# Patient Record
(demographics unavailable — no encounter records)

---

## 2025-04-01 NOTE — HISTORY OF PRESENT ILLNESS
[de-identified] : The patient comes in today with swelling to her right knee. She states it is atraumatic, but she notes having a lot of ecchymosis and bruising around her knee. The patient states the onset/injury occurred on 03/21/2025. This injury is not work related or due to an automobile accident. The patient states the pain is localized. The patient describes the pain as dull. The patient kneeling makes her symptoms worse. The patient indicates a pain level of 4 on a pain scale of 0-10.

## 2025-04-01 NOTE — PHYSICAL EXAM
[de-identified] : Right Knee:  Range of Motion in Degrees	 	                                        Claimant: 	         Normal:	 Flexion Active	                           135 	             135-degrees	 Flexion Passive	                   135	             135-degrees	 Extension Active	                    0-5	              0-5-degrees	 Extension Passive	                    0-5	              0-5-degrees	  No weakness to flexion/extension.  Positive pretibial tubercle bursitis with ecchymosis.  No evidence of infection. No evidence of instability in the AP plane or varus or valgus stress.  Negative Lachman.  Negative pivot shift.  Negative anterior drawer test.  Negative posterior drawer test.  Negative Coty.  Negative Apley grind.  No medial or lateral joint line tenderness.  No tenderness over the medial and lateral facet of the patella.  No patellofemoral crepitations.  No lateral tilting patella.  No patella apprehension.  No crepitation in the medial and lateral femoral condyle.  No proximal or distal swelling, edema or tenderness.  No gross motor or sensory deficits.  No intra-articular swelling.  2+ DP and PT pulses. No varus or valgus malalignment.     Left Knee:  Range of Motion in Degrees	 	                                  Claimant:	         Normal:	 Flexion Active	                   135 	                135-degrees	 Flexion Passive	           135	                135-degrees	 Extension Active	           0-5	                 0-5-degrees	 Extension Passive	           0-5	                 0-5-degrees	  No weakness to flexion/extension.  No evidence of instability in the AP plane or varus or valgus stress.  Negative Lachman.  Negative pivot shift.  Negative anterior drawer test.  Negative posterior drawer test.  Negative Coty.  Negative Apley grind.  No medial or lateral joint line tenderness.  No tenderness over the medial and lateral facet of the patella.  No patellofemoral crepitations.  No lateral tilting patella.  No patellar apprehension.  No crepitation in the medial and lateral femoral condyle.  No proximal or distal swelling, edema or tenderness.  No gross motor or sensory deficits.  No intra-articular swelling.  2+ DP and PT pulses. No varus or valgus malalignment.  Skin is intact.  No rashes, scars or lesions.     [de-identified] : Ambulating with a slightly antalgic to antalgic gait.  Station:  Normal.  [de-identified] : Appearance:  Well-developed, well-nourished female in no acute distress.   [de-identified] : Radiographs, which were taken in the office today, two views of the right knee, show mild degenerative changes.

## 2025-04-01 NOTE — DISCUSSION/SUMMARY
[de-identified] : At this time, due to pretibial bursitis of the right knee, I recommend wrapping, compression and ice. We discussed signs and symptoms of infection (at this point she shows none). She will be reassessed in two weeks.

## 2025-04-01 NOTE — HISTORY OF PRESENT ILLNESS
[de-identified] : The patient comes in today with swelling to her right knee. She states it is atraumatic, but she notes having a lot of ecchymosis and bruising around her knee. The patient states the onset/injury occurred on 03/21/2025. This injury is not work related or due to an automobile accident. The patient states the pain is localized. The patient describes the pain as dull. The patient kneeling makes her symptoms worse. The patient indicates a pain level of 4 on a pain scale of 0-10.

## 2025-04-01 NOTE — PHYSICAL EXAM
[de-identified] : Right Knee:  Range of Motion in Degrees	 	                                        Claimant: 	         Normal:	 Flexion Active	                           135 	             135-degrees	 Flexion Passive	                   135	             135-degrees	 Extension Active	                    0-5	              0-5-degrees	 Extension Passive	                    0-5	              0-5-degrees	  No weakness to flexion/extension.  Positive pretibial tubercle bursitis with ecchymosis.  No evidence of infection. No evidence of instability in the AP plane or varus or valgus stress.  Negative Lachman.  Negative pivot shift.  Negative anterior drawer test.  Negative posterior drawer test.  Negative Coty.  Negative Apley grind.  No medial or lateral joint line tenderness.  No tenderness over the medial and lateral facet of the patella.  No patellofemoral crepitations.  No lateral tilting patella.  No patella apprehension.  No crepitation in the medial and lateral femoral condyle.  No proximal or distal swelling, edema or tenderness.  No gross motor or sensory deficits.  No intra-articular swelling.  2+ DP and PT pulses. No varus or valgus malalignment.     Left Knee:  Range of Motion in Degrees	 	                                  Claimant:	         Normal:	 Flexion Active	                   135 	                135-degrees	 Flexion Passive	           135	                135-degrees	 Extension Active	           0-5	                 0-5-degrees	 Extension Passive	           0-5	                 0-5-degrees	  No weakness to flexion/extension.  No evidence of instability in the AP plane or varus or valgus stress.  Negative Lachman.  Negative pivot shift.  Negative anterior drawer test.  Negative posterior drawer test.  Negative Coty.  Negative Apley grind.  No medial or lateral joint line tenderness.  No tenderness over the medial and lateral facet of the patella.  No patellofemoral crepitations.  No lateral tilting patella.  No patellar apprehension.  No crepitation in the medial and lateral femoral condyle.  No proximal or distal swelling, edema or tenderness.  No gross motor or sensory deficits.  No intra-articular swelling.  2+ DP and PT pulses. No varus or valgus malalignment.  Skin is intact.  No rashes, scars or lesions.     [de-identified] : Ambulating with a slightly antalgic to antalgic gait.  Station:  Normal.  [de-identified] : Appearance:  Well-developed, well-nourished female in no acute distress.   [de-identified] : Radiographs, which were taken in the office today, two views of the right knee, show mild degenerative changes.

## 2025-04-01 NOTE — DISCUSSION/SUMMARY
[de-identified] : At this time, due to pretibial bursitis of the right knee, I recommend wrapping, compression and ice. We discussed signs and symptoms of infection (at this point she shows none). She will be reassessed in two weeks.

## 2025-04-01 NOTE — DISCUSSION/SUMMARY
[de-identified] : At this time, due to pretibial bursitis of the right knee, I recommend wrapping, compression and ice. We discussed signs and symptoms of infection (at this point she shows none). She will be reassessed in two weeks.

## 2025-04-01 NOTE — HISTORY OF PRESENT ILLNESS
[de-identified] : The patient comes in today with swelling to her right knee. She states it is atraumatic, but she notes having a lot of ecchymosis and bruising around her knee. The patient states the onset/injury occurred on 03/21/2025. This injury is not work related or due to an automobile accident. The patient states the pain is localized. The patient describes the pain as dull. The patient kneeling makes her symptoms worse. The patient indicates a pain level of 4 on a pain scale of 0-10.

## 2025-04-01 NOTE — PHYSICAL EXAM
[de-identified] : Right Knee:  Range of Motion in Degrees	 	                                        Claimant: 	         Normal:	 Flexion Active	                           135 	             135-degrees	 Flexion Passive	                   135	             135-degrees	 Extension Active	                    0-5	              0-5-degrees	 Extension Passive	                    0-5	              0-5-degrees	  No weakness to flexion/extension.  Positive pretibial tubercle bursitis with ecchymosis.  No evidence of infection. No evidence of instability in the AP plane or varus or valgus stress.  Negative Lachman.  Negative pivot shift.  Negative anterior drawer test.  Negative posterior drawer test.  Negative Coty.  Negative Apley grind.  No medial or lateral joint line tenderness.  No tenderness over the medial and lateral facet of the patella.  No patellofemoral crepitations.  No lateral tilting patella.  No patella apprehension.  No crepitation in the medial and lateral femoral condyle.  No proximal or distal swelling, edema or tenderness.  No gross motor or sensory deficits.  No intra-articular swelling.  2+ DP and PT pulses. No varus or valgus malalignment.     Left Knee:  Range of Motion in Degrees	 	                                  Claimant:	         Normal:	 Flexion Active	                   135 	                135-degrees	 Flexion Passive	           135	                135-degrees	 Extension Active	           0-5	                 0-5-degrees	 Extension Passive	           0-5	                 0-5-degrees	  No weakness to flexion/extension.  No evidence of instability in the AP plane or varus or valgus stress.  Negative Lachman.  Negative pivot shift.  Negative anterior drawer test.  Negative posterior drawer test.  Negative Coty.  Negative Apley grind.  No medial or lateral joint line tenderness.  No tenderness over the medial and lateral facet of the patella.  No patellofemoral crepitations.  No lateral tilting patella.  No patellar apprehension.  No crepitation in the medial and lateral femoral condyle.  No proximal or distal swelling, edema or tenderness.  No gross motor or sensory deficits.  No intra-articular swelling.  2+ DP and PT pulses. No varus or valgus malalignment.  Skin is intact.  No rashes, scars or lesions.     [de-identified] : Ambulating with a slightly antalgic to antalgic gait.  Station:  Normal.  [de-identified] : Appearance:  Well-developed, well-nourished female in no acute distress.   [de-identified] : Radiographs, which were taken in the office today, two views of the right knee, show mild degenerative changes.

## 2025-04-01 NOTE — PHYSICAL EXAM
[de-identified] : Right Knee:  Range of Motion in Degrees	 	                                        Claimant: 	         Normal:	 Flexion Active	                           135 	             135-degrees	 Flexion Passive	                   135	             135-degrees	 Extension Active	                    0-5	              0-5-degrees	 Extension Passive	                    0-5	              0-5-degrees	  No weakness to flexion/extension.  Positive pretibial tubercle bursitis with ecchymosis.  No evidence of infection. No evidence of instability in the AP plane or varus or valgus stress.  Negative Lachman.  Negative pivot shift.  Negative anterior drawer test.  Negative posterior drawer test.  Negative Coty.  Negative Apley grind.  No medial or lateral joint line tenderness.  No tenderness over the medial and lateral facet of the patella.  No patellofemoral crepitations.  No lateral tilting patella.  No patella apprehension.  No crepitation in the medial and lateral femoral condyle.  No proximal or distal swelling, edema or tenderness.  No gross motor or sensory deficits.  No intra-articular swelling.  2+ DP and PT pulses. No varus or valgus malalignment.     Left Knee:  Range of Motion in Degrees	 	                                  Claimant:	         Normal:	 Flexion Active	                   135 	                135-degrees	 Flexion Passive	           135	                135-degrees	 Extension Active	           0-5	                 0-5-degrees	 Extension Passive	           0-5	                 0-5-degrees	  No weakness to flexion/extension.  No evidence of instability in the AP plane or varus or valgus stress.  Negative Lachman.  Negative pivot shift.  Negative anterior drawer test.  Negative posterior drawer test.  Negative Coty.  Negative Apley grind.  No medial or lateral joint line tenderness.  No tenderness over the medial and lateral facet of the patella.  No patellofemoral crepitations.  No lateral tilting patella.  No patellar apprehension.  No crepitation in the medial and lateral femoral condyle.  No proximal or distal swelling, edema or tenderness.  No gross motor or sensory deficits.  No intra-articular swelling.  2+ DP and PT pulses. No varus or valgus malalignment.  Skin is intact.  No rashes, scars or lesions.     [de-identified] : Ambulating with a slightly antalgic to antalgic gait.  Station:  Normal.  [de-identified] : Appearance:  Well-developed, well-nourished female in no acute distress.   [de-identified] : Radiographs, which were taken in the office today, two views of the right knee, show mild degenerative changes.

## 2025-04-01 NOTE — HISTORY OF PRESENT ILLNESS
[de-identified] : The patient comes in today with swelling to her right knee. She states it is atraumatic, but she notes having a lot of ecchymosis and bruising around her knee. The patient states the onset/injury occurred on 03/21/2025. This injury is not work related or due to an automobile accident. The patient states the pain is localized. The patient describes the pain as dull. The patient kneeling makes her symptoms worse. The patient indicates a pain level of 4 on a pain scale of 0-10.

## 2025-04-01 NOTE — ADDENDUM
[FreeTextEntry1] : This note was written by Kelly Arteaga on 03/26/2025 acting as a scribe for DAVON ZAIDI III, MD

## 2025-04-01 NOTE — DISCUSSION/SUMMARY
[de-identified] : At this time, due to pretibial bursitis of the right knee, I recommend wrapping, compression and ice. We discussed signs and symptoms of infection (at this point she shows none). She will be reassessed in two weeks.

## 2025-04-09 NOTE — PHYSICAL EXAM
[de-identified] :     Right Knee: Range of Motion in Degrees                                       Claimant:    Normal:  Flexion Active              135             135-degrees  Flexion Passive           135             135-degrees  Extension Active          0-5              0-5-degrees  Extension Passive       0-5              0-5-degrees   There are no signs of any infection.  No calf tenderness.  Good distal pulses.     [de-identified] : Gait and Station:  Ambulating with a slightly antalgic to antalgic gait.  Normal Station.  [de-identified] : Appearance:  Well-developed, well-nourished female in no acute distress.

## 2025-04-09 NOTE — ADDENDUM
[FreeTextEntry1] : This note was written by Mando Joyner on 04/09/2025, acting as a scribe for JOSAFAT MANZANARES, SHAMIR/L, PA

## 2025-04-09 NOTE — DISCUSSION/SUMMARY
[de-identified] : At this time, due to bursitis of the right knee, the patient will continue with the Reparel brace 24/7 unless she is showering, look for signs of infection and ice it.  She is on a blood thinner, so she is not going to take any anti-inflammatories, only if she really, really needs it, and she will return back to the office in two weeks.

## 2025-04-09 NOTE — HISTORY OF PRESENT ILLNESS
[de-identified] : The patient comes in today for her right knee.  The patient states the bursitis has not gone down much, but she has not been keeping the brace on 24/7.  She states she was unaware that it was supposed to be on for 24/7 and she was taking it off at nighttime.  She did get the Reparel brace, but she just recently got it.